# Patient Record
Sex: MALE | Employment: OTHER | URBAN - METROPOLITAN AREA
[De-identification: names, ages, dates, MRNs, and addresses within clinical notes are randomized per-mention and may not be internally consistent; named-entity substitution may affect disease eponyms.]

---

## 2021-01-28 ENCOUNTER — IMMUNIZATION (OUTPATIENT)
Dept: LAB | Facility: HOSPITAL | Age: 76
End: 2021-01-28
Attending: EMERGENCY MEDICINE
Payer: MEDICARE

## 2021-01-28 DIAGNOSIS — Z23 NEED FOR VACCINATION: Primary | ICD-10-CM

## 2021-01-28 PROCEDURE — 0011A SARSCOV2 VAC 100MCG/0.5ML IM: CPT

## 2021-02-25 ENCOUNTER — IMMUNIZATION (OUTPATIENT)
Dept: LAB | Facility: HOSPITAL | Age: 76
End: 2021-02-25
Attending: EMERGENCY MEDICINE
Payer: MEDICARE

## 2021-02-25 DIAGNOSIS — Z23 NEED FOR VACCINATION: Primary | ICD-10-CM

## 2021-02-25 PROCEDURE — 0012A SARSCOV2 VAC 100MCG/0.5ML IM: CPT

## 2021-10-29 ENCOUNTER — IMMUNIZATION (OUTPATIENT)
Dept: LAB | Facility: HOSPITAL | Age: 76
End: 2021-10-29
Attending: EMERGENCY MEDICINE
Payer: MEDICARE

## 2021-10-29 DIAGNOSIS — Z23 NEED FOR VACCINATION: Primary | ICD-10-CM

## 2021-10-29 PROCEDURE — 0064A SARSCOV2 VAC 50MCG/0.25ML IM: CPT

## 2023-08-11 ENCOUNTER — HOSPITAL ENCOUNTER (EMERGENCY)
Facility: HOSPITAL | Age: 78
Discharge: HOME | End: 2023-08-11
Attending: EMERGENCY MEDICINE
Payer: MEDICARE

## 2023-08-11 VITALS
HEIGHT: 64 IN | SYSTOLIC BLOOD PRESSURE: 152 MMHG | TEMPERATURE: 98 F | RESPIRATION RATE: 16 BRPM | DIASTOLIC BLOOD PRESSURE: 68 MMHG | HEART RATE: 68 BPM | OXYGEN SATURATION: 99 % | WEIGHT: 113 LBS | BODY MASS INDEX: 19.29 KG/M2

## 2023-08-11 DIAGNOSIS — R63.0 DECREASED APPETITE: ICD-10-CM

## 2023-08-11 DIAGNOSIS — U07.1 COVID-19: Primary | ICD-10-CM

## 2023-08-11 LAB
ALBUMIN SERPL-MCNC: 2.9 G/DL (ref 3.4–5)
ALBUMIN/GLOB SERPL: 0.6 {RATIO} (ref 1–2)
ALP LIVER SERPL-CCNC: 48 U/L
ALT SERPL-CCNC: 41 U/L
ANION GAP SERPL CALC-SCNC: 5 MMOL/L (ref 0–18)
AST SERPL-CCNC: 71 U/L (ref 15–37)
BASOPHILS # BLD AUTO: 0.02 X10(3) UL (ref 0–0.2)
BASOPHILS NFR BLD AUTO: 0.3 %
BILIRUB SERPL-MCNC: 0.7 MG/DL (ref 0.1–2)
BUN BLD-MCNC: 25 MG/DL (ref 7–18)
BUN/CREAT SERPL: 24 (ref 10–20)
CALCIUM BLD-MCNC: 8.4 MG/DL (ref 8.5–10.1)
CHLORIDE SERPL-SCNC: 104 MMOL/L (ref 98–112)
CO2 SERPL-SCNC: 30 MMOL/L (ref 21–32)
CREAT BLD-MCNC: 1.04 MG/DL
DEPRECATED RDW RBC AUTO: 45.8 FL (ref 35.1–46.3)
EGFRCR SERPLBLD CKD-EPI 2021: 73 ML/MIN/1.73M2 (ref 60–?)
EOSINOPHIL # BLD AUTO: 0.09 X10(3) UL (ref 0–0.7)
EOSINOPHIL NFR BLD AUTO: 1.5 %
ERYTHROCYTE [DISTWIDTH] IN BLOOD BY AUTOMATED COUNT: 13 % (ref 11–15)
GLOBULIN PLAS-MCNC: 4.7 G/DL (ref 2.8–4.4)
GLUCOSE BLD-MCNC: 107 MG/DL (ref 70–99)
HCT VFR BLD AUTO: 42.5 %
HGB BLD-MCNC: 14.1 G/DL
IMM GRANULOCYTES # BLD AUTO: 0.09 X10(3) UL (ref 0–1)
IMM GRANULOCYTES NFR BLD: 1.5 %
LYMPHOCYTES # BLD AUTO: 0.89 X10(3) UL (ref 1–4)
LYMPHOCYTES NFR BLD AUTO: 14.8 %
MCH RBC QN AUTO: 31.5 PG (ref 26–34)
MCHC RBC AUTO-ENTMCNC: 33.2 G/DL (ref 31–37)
MCV RBC AUTO: 94.9 FL
MONOCYTES # BLD AUTO: 0.56 X10(3) UL (ref 0.1–1)
MONOCYTES NFR BLD AUTO: 9.3 %
NEUTROPHILS # BLD AUTO: 4.36 X10 (3) UL (ref 1.5–7.7)
NEUTROPHILS # BLD AUTO: 4.36 X10(3) UL (ref 1.5–7.7)
NEUTROPHILS NFR BLD AUTO: 72.6 %
OSMOLALITY SERPL CALC.SUM OF ELEC: 293 MOSM/KG (ref 275–295)
PLATELET # BLD AUTO: 244 10(3)UL (ref 150–450)
POTASSIUM SERPL-SCNC: 3.5 MMOL/L (ref 3.5–5.1)
PROT SERPL-MCNC: 7.6 G/DL (ref 6.4–8.2)
RBC # BLD AUTO: 4.48 X10(6)UL
SODIUM SERPL-SCNC: 139 MMOL/L (ref 136–145)
WBC # BLD AUTO: 6 X10(3) UL (ref 4–11)

## 2023-08-11 PROCEDURE — 96360 HYDRATION IV INFUSION INIT: CPT

## 2023-08-11 PROCEDURE — 99284 EMERGENCY DEPT VISIT MOD MDM: CPT

## 2023-08-11 PROCEDURE — 80053 COMPREHEN METABOLIC PANEL: CPT | Performed by: EMERGENCY MEDICINE

## 2023-08-11 PROCEDURE — 85025 COMPLETE CBC W/AUTO DIFF WBC: CPT | Performed by: EMERGENCY MEDICINE

## 2023-08-12 NOTE — ED INITIAL ASSESSMENT (HPI)
Pt presents from home with c/o no appetite, not wanting to drink and being more lethargic per wife. Pt tested pos for Covid on 8/1 after coming home from being on a cruise ship. Pt is A&O x 4 with no weakness, slurred speech or facial asymmetry noted.

## 2023-11-08 ENCOUNTER — HOSPITAL ENCOUNTER (EMERGENCY)
Facility: HOSPITAL | Age: 78
Discharge: HOME | End: 2023-11-08
Attending: EMERGENCY MEDICINE
Payer: MEDICARE

## 2023-11-08 ENCOUNTER — APPOINTMENT (OUTPATIENT)
Dept: GENERAL RADIOLOGY | Facility: HOSPITAL | Age: 78
End: 2023-11-08
Attending: EMERGENCY MEDICINE
Payer: MEDICARE

## 2023-11-08 VITALS
HEART RATE: 69 BPM | TEMPERATURE: 98 F | RESPIRATION RATE: 17 BRPM | SYSTOLIC BLOOD PRESSURE: 129 MMHG | OXYGEN SATURATION: 100 % | DIASTOLIC BLOOD PRESSURE: 62 MMHG

## 2023-11-08 DIAGNOSIS — J18.9 PNEUMONIA OF LEFT LOWER LOBE DUE TO INFECTIOUS ORGANISM: Primary | ICD-10-CM

## 2023-11-08 LAB
ANION GAP SERPL CALC-SCNC: 7 MMOL/L (ref 0–18)
BASOPHILS # BLD AUTO: 0.05 X10(3) UL (ref 0–0.2)
BASOPHILS NFR BLD AUTO: 0.7 %
BUN BLD-MCNC: 28 MG/DL (ref 9–23)
BUN/CREAT SERPL: 26.4 (ref 10–20)
CALCIUM BLD-MCNC: 9.3 MG/DL (ref 8.7–10.4)
CHLORIDE SERPL-SCNC: 107 MMOL/L (ref 98–112)
CO2 SERPL-SCNC: 26 MMOL/L (ref 21–32)
CREAT BLD-MCNC: 1.06 MG/DL
DEPRECATED RDW RBC AUTO: 51.3 FL (ref 35.1–46.3)
EGFRCR SERPLBLD CKD-EPI 2021: 71 ML/MIN/1.73M2 (ref 60–?)
EOSINOPHIL # BLD AUTO: 0.69 X10(3) UL (ref 0–0.7)
EOSINOPHIL NFR BLD AUTO: 10.3 %
ERYTHROCYTE [DISTWIDTH] IN BLOOD BY AUTOMATED COUNT: 14.2 % (ref 11–15)
GLUCOSE BLD-MCNC: 127 MG/DL (ref 70–99)
HCT VFR BLD AUTO: 36.3 %
HGB BLD-MCNC: 11.8 G/DL
IMM GRANULOCYTES # BLD AUTO: 0.02 X10(3) UL (ref 0–1)
IMM GRANULOCYTES NFR BLD: 0.3 %
LYMPHOCYTES # BLD AUTO: 1.25 X10(3) UL (ref 1–4)
LYMPHOCYTES NFR BLD AUTO: 18.7 %
MCH RBC QN AUTO: 32 PG (ref 26–34)
MCHC RBC AUTO-ENTMCNC: 32.5 G/DL (ref 31–37)
MCV RBC AUTO: 98.4 FL
MONOCYTES # BLD AUTO: 0.8 X10(3) UL (ref 0.1–1)
MONOCYTES NFR BLD AUTO: 12 %
NEUTROPHILS # BLD AUTO: 3.87 X10 (3) UL (ref 1.5–7.7)
NEUTROPHILS # BLD AUTO: 3.87 X10(3) UL (ref 1.5–7.7)
NEUTROPHILS NFR BLD AUTO: 58 %
OSMOLALITY SERPL CALC.SUM OF ELEC: 297 MOSM/KG (ref 275–295)
PLATELET # BLD AUTO: 214 10(3)UL (ref 150–450)
POTASSIUM SERPL-SCNC: 3.8 MMOL/L (ref 3.5–5.1)
RBC # BLD AUTO: 3.69 X10(6)UL
SARS-COV-2 RNA RESP QL NAA+PROBE: NOT DETECTED
SODIUM SERPL-SCNC: 140 MMOL/L (ref 136–145)
WBC # BLD AUTO: 6.7 X10(3) UL (ref 4–11)

## 2023-11-08 PROCEDURE — 80048 BASIC METABOLIC PNL TOTAL CA: CPT | Performed by: EMERGENCY MEDICINE

## 2023-11-08 PROCEDURE — 99285 EMERGENCY DEPT VISIT HI MDM: CPT

## 2023-11-08 PROCEDURE — 85025 COMPLETE CBC W/AUTO DIFF WBC: CPT | Performed by: EMERGENCY MEDICINE

## 2023-11-08 PROCEDURE — 99284 EMERGENCY DEPT VISIT MOD MDM: CPT

## 2023-11-08 PROCEDURE — 71045 X-RAY EXAM CHEST 1 VIEW: CPT | Performed by: EMERGENCY MEDICINE

## 2023-11-08 PROCEDURE — 36415 COLL VENOUS BLD VENIPUNCTURE: CPT

## 2023-11-08 RX ORDER — AMOXICILLIN AND CLAVULANATE POTASSIUM 875; 125 MG/1; MG/1
1 TABLET, FILM COATED ORAL 2 TIMES DAILY
Qty: 20 TABLET | Refills: 0 | Status: SHIPPED | OUTPATIENT
Start: 2023-11-08 | End: 2023-11-18

## 2023-11-09 NOTE — ED INITIAL ASSESSMENT (HPI)
Pt to ED via triage c/o cough and runny nose since last night. Also had a mechanical fall in the shower today but denies hitting head. Per family, pt appears pale and weaker than normal, also concerned for dementia.  Family and patient poor historians

## 2023-11-09 NOTE — DISCHARGE INSTRUCTIONS
Return for fever or shortness of breath    Follow-up with your doctor or the 1 provided in a few days

## 2024-10-29 ENCOUNTER — APPOINTMENT (OUTPATIENT)
Dept: GENERAL RADIOLOGY | Facility: HOSPITAL | Age: 79
End: 2024-10-29
Attending: EMERGENCY MEDICINE
Payer: MEDICARE

## 2024-10-29 ENCOUNTER — APPOINTMENT (OUTPATIENT)
Dept: CT IMAGING | Facility: HOSPITAL | Age: 79
End: 2024-10-29
Attending: EMERGENCY MEDICINE
Payer: MEDICARE

## 2024-10-29 ENCOUNTER — HOSPITAL ENCOUNTER (EMERGENCY)
Facility: HOSPITAL | Age: 79
Discharge: HOME | End: 2024-10-29
Attending: EMERGENCY MEDICINE
Payer: MEDICARE

## 2024-10-29 VITALS
HEART RATE: 71 BPM | BODY MASS INDEX: 21.34 KG/M2 | OXYGEN SATURATION: 100 % | SYSTOLIC BLOOD PRESSURE: 149 MMHG | DIASTOLIC BLOOD PRESSURE: 64 MMHG | RESPIRATION RATE: 13 BRPM | TEMPERATURE: 98 F | WEIGHT: 125 LBS | HEIGHT: 64 IN

## 2024-10-29 DIAGNOSIS — S09.90XA INJURY OF HEAD, INITIAL ENCOUNTER: ICD-10-CM
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
## 2024-10-29 DIAGNOSIS — S01.01XA SCALP LACERATION, INITIAL ENCOUNTER: ICD-10-CM

## 2024-10-29 LAB
ATRIAL RATE: 87 BPM
P-R INTERVAL: 176 MS
Q-T INTERVAL: 352 MS
QRS DURATION: 68 MS
QTC CALCULATION (BEZET): 423 MS
R AXIS: 67 DEGREES
T AXIS: 73 DEGREES
VENTRICULAR RATE: 87 BPM

## 2024-10-29 PROCEDURE — 70450 CT HEAD/BRAIN W/O DYE: CPT | Performed by: EMERGENCY MEDICINE

## 2024-10-29 PROCEDURE — 12001 RPR S/N/AX/GEN/TRNK 2.5CM/<: CPT

## 2024-10-29 PROCEDURE — 93005 ELECTROCARDIOGRAM TRACING: CPT

## 2024-10-29 PROCEDURE — 99284 EMERGENCY DEPT VISIT MOD MDM: CPT

## 2024-10-29 PROCEDURE — 90471 IMMUNIZATION ADMIN: CPT

## 2024-10-29 PROCEDURE — 73560 X-RAY EXAM OF KNEE 1 OR 2: CPT | Performed by: EMERGENCY MEDICINE

## 2024-10-29 PROCEDURE — 93010 ELECTROCARDIOGRAM REPORT: CPT

## 2024-10-29 RX ORDER — ACETAMINOPHEN 500 MG
1000 TABLET ORAL ONCE
Status: DISCONTINUED | OUTPATIENT
Start: 2024-10-29 | End: 2024-10-29

## 2024-10-29 NOTE — ED PROVIDER NOTES
Patient Seen in: [facility] Emergency Department    History     Chief Complaint   Patient presents with    Fall     Stated Complaint: unwitnessed fall, weakness     HPI    79-year-old male with past moister dementia, prostate cancer, CAD on aspirin presenting with family for evaluation status post fall.  Patient primarily wheelchair and bedbound with intermittent episodes of limited mobility requiring walker assistance -family tending to remain telephone with patient's standing with walker though otherwise unassisted during this time which family thereafter hearing patient fall to ground.  No loss of consciousness or vomiting.  No overt complaints aside from questionable right knee pain.  On aspirin without other anticoagulants/antiplatelets.  Unclear tetanus status.    Past Medical History:    Cancer (HCC)    Dementia (HCC)    Prostate cancer (HCC)       No past surgical history on file.         No family history on file.    Social History     Socioeconomic History    Marital status:    Tobacco Use    Smoking status: Never    Smokeless tobacco: Never     Social Drivers of Health     Financial Resource Strain: Low Risk  (10/27/2024)    Received from [facility]    Overall Financial Resource Strain (CARDIA)     Difficulty of Paying Living Expenses: Not hard at all   Food Insecurity: No Food Insecurity (10/27/2024)    Received from [facility]    Hunger Vital Sign     Worried About Running Out of Food in the Last Year: Never true     Ran Out of Food in the Last Year: Never true   Transportation Needs: No Transportation Needs (10/27/2024)    Received from [facility]    PRAPARE - Transportation     Lack of Transportation (Medical): No     Lack of Transportation (Non-Medical): No   Housing Stability: Unknown (10/27/2024)    Received from [facility]    Housing Stability Vital Sign     Unable to Pay for Housing in the Last  Year: No       Review of Systems : LIMITED BY CLINICAL CONDITION  Constitutional: As per HPI  Positive for stated complaint: unwitnessed fall, weakness  Other systems are as noted in HPI.  Constitutional and vital signs reviewed.      All other systems reviewed and negative except as noted above.    PSFH elements reviewed from today and agreed except as otherwise stated in HPI.    Physical Exam     ED Triage Vitals [10/29/24 1335]   /79   Pulse 86   Resp 18   Temp 97.9 °F (36.6 °C)   Temp src Temporal   SpO2 97 %   O2 Device None (Room air)       Current:/79   Pulse 86   Temp 97.9 °F (36.6 °C) (Temporal)   Resp 18   Ht 162.6 cm (5' 4\")   Wt 56.7 kg   SpO2 97%   BMI 21.46 kg/m²         Physical Exam   Constitutional: No distress.   HEENT: Dry MM.  Head: Normocephalic. Occipital scalp abrasion/laceration without active bleeding or associated crepitance.  Eyes: No injection. Pupils midrange and equally reactive. Full/painless EOM. No proptosis/hyphema.  Neck: Neck supple. No midline c-spine  Cardiovascular: RRR. BLE with 2+ DP/PT pulses.  Pulmonary/Chest: Effort normal. CTAB.  Abdominal: Soft. Nontender.  Musculoskeletal: No gross deformity.  No midline thoracolumbar tenderness/step-off/deformity.  Neurological: Alert, nonverbal and at seeming neurologic baseline per family at bedside.  No overt focal deficits with grossly intact extremity movements and with fine/further neurologic examination limited by clinical condition.  Skin: Skin is warm.   Psychiatric: Cooperative.  Nursing note and vitals reviewed.        ED Course   Labs Reviewed - No data to display  EKG    Rate, intervals and axes as noted on EKG Report.  Rate: 87  Rhythm: Sinus Rhythm  Reading: NSR 87 without ST elevation as independently interpreted by myself         XR KNEE (1 OR 2 VIEWS), RIGHT (CPT=73560)    Result Date: 10/29/2024  PROCEDURE: XR KNEE (1 OR 2 VIEWS), RIGHT (CPT=73560)  COMPARISON: None.  INDICATIONS: Generalized right  knee pain, Unwittnessed fall today.  TECHNIQUE: Two views Impression:  Normal alignment.  No fracture.  Small effusion Finalized by (CST): Js Martinez MD on 10/29/2024 at 3:01 PM          CT BRAIN OR HEAD (CPT=70450)    Result Date: 10/29/2024  PROCEDURE: CT BRAIN OR HEAD (CPT=70450)  COMPARISON: None.  INDICATIONS: fall, weakness, laceraction to back of head  TECHNIQUE: CT images were obtained without contrast material.  Automated exposure control for dose reduction was used.  Dose information is transmitted to the ACR (American College of Radiology) NRDR (National Radiology Data Registry) which includes the Dose Index Registry.  FINDINGS:  There is a very small soft tissue contusion at the lateral right parietal vertex.  There is no underlying calvarial fracture or acute intracranial hemorrhage.  There are moderate global atrophic changes throughout the cerebral and cerebellar hemispheres.  There are moderate to large sized areas of low-attenuation in the periventricular and deep subcortical white matter compatible with chronic ischemic white matter changes.  There is slight increased prominence of the ventricular system raising the possibility of normal pressure hydrocephalus.  There are senescent calcifications within the bilateral basal ganglia.           CONCLUSION: No acute intracranial abnormality.  Nonacute findings are present and are described within the body of the report    Dictated by (CST): Trev Ashton MD on 10/29/2024 at 2:50 PM     Finalized by (CST): Trev Ashton MD on 10/29/2024 at 2:55 PM           Laceration repair:  Verbal consent was obtained from the patient and family.  The  2cm laceration on the occipital scalp was scrubbed, draped and explored to its base with a gloved finger.  There were no deep structures involved. No tendon injury was identified.   The wound was repaired with staples x1.  The wound repair was simple.  The procedure was performed by myself.         MDM    DIFFERENTIAL DIAGNOSIS: After history and physical exam differential diagnosis includes but is not limited to intracranial hemorrhage/contusion, laceration, fracture.    Pulse ox: 97%:Normal on RA, as independently interpreted by myself    Medical Decision Making  Evaluation for mechanical fall with secondary head injury on aspirin without focal neurodeficits or change from neurologic baseline as per family bedside - imaging nonacute as noted with laceration repaired by staple x2, stable for discharge with ongoing outpatient followup.    Problems Addressed:  Accidental fall, initial encounter: acute illness or injury     Details: With secondary head injury/scalp laceration  Injury of head, initial encounter: acute illness or injury     Details: With secondary scalp laceration  Scalp laceration, initial encounter: acute illness or injury    Amount and/or Complexity of Data Reviewed  Independent Historian: caregiver and spouse     Details: Collateral history obtained from wife and son at bedside  External Data Reviewed: labs.     Details: 9/26/2024 OSH labs, 11/2021 bone scan, 9/27/2024 OSH oncology note reviewed  Radiology: ordered and independent interpretation performed. Decision-making details documented in ED Course.     Details: XR knee without obvious dislocation as independently interpreted by myself  CTH without obvious ICH as independently interpreted by myself    ECG/medicine tests: ordered and independent interpretation performed. Decision-making details documented in ED Course.      I was wearing at minimum a facemask and eye protection throughout this encounter with handwashing performed prior and after patient evaluation without personal hand/facial/oropharyngeal contact and gloves worn throughout encounter. See note and/or contact this provider for further PPE details.    Disposition and Plan     Clinical Impression:  1. Accidental fall, initial encounter    2. Injury of head, initial encounter    3.  Scalp laceration, initial encounter        Disposition:  Discharge    Follow-up:  Louise Cristobal MD  [id number] [address] [phone]    Call  For followup and re-evaluation.    [address] [id number] [phone]  Go to  For followup, re-evaluation and staple removal in 7-10 days.      Medications Prescribed:  There are no discharge medications for this patient.

## 2024-10-29 NOTE — ED INITIAL ASSESSMENT (HPI)
Pt presents to the Ed with wife, wife reports patient had an unwitnessed fall from standing. Wife denies loc. +laceration to the back of the head. History of Dementia, not speaking, per wife this is pt's baseline. Pt in on a daily aspirin.

## 2024-11-16 ENCOUNTER — HOSPITAL ENCOUNTER (OUTPATIENT)
Age: 79
Discharge: HOME | End: 2024-11-16
Payer: MEDICARE

## 2024-11-16 VITALS
DIASTOLIC BLOOD PRESSURE: 75 MMHG | HEART RATE: 106 BPM | SYSTOLIC BLOOD PRESSURE: 117 MMHG | TEMPERATURE: 98 F | RESPIRATION RATE: 18 BRPM | OXYGEN SATURATION: 100 %

## 2024-11-16 DIAGNOSIS — S51.819A SKIN TEAR OF FOREARM WITHOUT COMPLICATION, UNSPECIFIED LATERALITY, INITIAL ENCOUNTER: Primary | ICD-10-CM

## 2024-11-16 RX ORDER — ATORVASTATIN CALCIUM 80 MG/1
80 TABLET, FILM COATED ORAL DAILY
COMMUNITY
Start: 2016-11-15

## 2024-11-16 RX ORDER — ASPIRIN 81 MG/1
TABLET, CHEWABLE ORAL
COMMUNITY
Start: 2016-11-15

## 2024-11-16 RX ORDER — RIVASTIGMINE 4.6 MG/24H
1 PATCH, EXTENDED RELEASE TRANSDERMAL DAILY
COMMUNITY
Start: 2024-08-20

## 2024-11-16 RX ORDER — METOPROLOL SUCCINATE 25 MG/1
25 TABLET, EXTENDED RELEASE ORAL DAILY
COMMUNITY
Start: 2024-04-22

## 2024-11-16 NOTE — ED INITIAL ASSESSMENT (HPI)
Patient brought in by family. Wife states she attempted helping patient up and this caused skin tear on right wrist and noticed blistering on hand.

## 2024-11-16 NOTE — ED PROVIDER NOTES
Patient Seen in: [facility]      History     Chief Complaint   Patient presents with    Skin Problem     Stated Complaint: skin issue    Subjective:   HPI    79-year-old male presents to immediate care with family.  His wife states she was helping him stand and grabbed a hold of his wrists creating a skin tear.  There is a round skin avulsion right wrist.  There also is some intact blisters on the inner aspects of his right fingers.      Objective:     Past Medical History:    Cancer (HCC)    Dementia (HCC)    Prostate cancer (HCC)              History reviewed. No pertinent surgical history.             Social History     Socioeconomic History    Marital status:    Tobacco Use    Smoking status: Never    Smokeless tobacco: Never     Social Drivers of Health     Financial Resource Strain: Low Risk  (10/27/2024)    Received from [facility]    Overall Financial Resource Strain (CARDIA)     Difficulty of Paying Living Expenses: Not hard at all   Food Insecurity: No Food Insecurity (10/27/2024)    Received from [facility]    Hunger Vital Sign     Worried About Running Out of Food in the Last Year: Never true     Ran Out of Food in the Last Year: Never true   Transportation Needs: No Transportation Needs (10/27/2024)    Received from [facility]    PRAPARE - Transportation     Lack of Transportation (Medical): No     Lack of Transportation (Non-Medical): No   Housing Stability: Unknown (10/27/2024)    Received from [facility]    Housing Stability Vital Sign     Unable to Pay for Housing in the Last Year: No              Review of Systems    Positive for stated complaint: skin issue  Other systems are as noted in HPI.  Constitutional and vital signs reviewed.      All other systems reviewed and negative except as noted above.    Physical Exam     ED Triage Vitals [11/16/24 1305]   /75   Pulse 106   Resp 18    Temp 97.6 °F (36.4 °C)   Temp src Axillary   SpO2 100 %   O2 Device None (Room air)       Current Vitals:   Vital Signs  BP: 117/75  Pulse: 106  Resp: 18  Temp: 97.6 °F (36.4 °C)  Temp src: Axillary    Oxygen Therapy  SpO2: 100 %  O2 Device: None (Room air)        Physical Exam  Vitals reviewed.   HENT:      Mouth/Throat:      Mouth: Mucous membranes are moist.   Cardiovascular:      Rate and Rhythm: Normal rate and regular rhythm.   Pulmonary:      Effort: Pulmonary effort is normal.      Breath sounds: Normal breath sounds.   Musculoskeletal:        Arms:       Comments: + skin avulsion   R forearm    neg bleeding.  Approx 3.0cm round  + intact fluid filled blisters inner aspect of R fingers.  Neg surrounding erythema   Skin:     General: Skin is warm.   Psychiatric:         Mood and Affect: Mood normal.         Behavior: Behavior normal.             ED Course   Labs Reviewed - No data to display                MDM              Medical Decision Making  Risk  OTC drugs.        Disposition and Plan     Clinical Impression:  1. Skin tear of forearm without complication, unspecified laterality, initial encounter         Disposition:  Discharge  11/16/2024  1:54 pm    Follow-up:  Louise Cristobal MD  [id number] [id number] [phone]      If symptoms worsen          Medications Prescribed:  Discharge Medication List as of 11/16/2024  1:54 PM              Supplementary Documentation: